# Patient Record
Sex: MALE | Race: WHITE | Employment: FULL TIME | URBAN - METROPOLITAN AREA
[De-identification: names, ages, dates, MRNs, and addresses within clinical notes are randomized per-mention and may not be internally consistent; named-entity substitution may affect disease eponyms.]

---

## 2018-11-05 ENCOUNTER — OFFICE VISIT (OUTPATIENT)
Dept: FAMILY MEDICINE CLINIC | Age: 38
End: 2018-11-05
Payer: COMMERCIAL

## 2018-11-05 VITALS
SYSTOLIC BLOOD PRESSURE: 123 MMHG | BODY MASS INDEX: 25.86 KG/M2 | HEIGHT: 75 IN | DIASTOLIC BLOOD PRESSURE: 73 MMHG | HEART RATE: 73 BPM | WEIGHT: 208 LBS | RESPIRATION RATE: 18 BRPM

## 2018-11-05 DIAGNOSIS — Z00.00 ROUTINE CHECK-UP: Primary | ICD-10-CM

## 2018-11-05 PROCEDURE — 99396 PREV VISIT EST AGE 40-64: CPT | Performed by: FAMILY MEDICINE

## 2018-11-05 ASSESSMENT — PATIENT HEALTH QUESTIONNAIRE - PHQ9
SUM OF ALL RESPONSES TO PHQ QUESTIONS 1-9: 0
SUM OF ALL RESPONSES TO PHQ QUESTIONS 1-9: 0
2. FEELING DOWN, DEPRESSED OR HOPELESS: 0
SUM OF ALL RESPONSES TO PHQ9 QUESTIONS 1 & 2: 0
1. LITTLE INTEREST OR PLEASURE IN DOING THINGS: 0

## 2018-11-05 NOTE — PROGRESS NOTES
goiter and no bruits. Chest : no deformities. Lungs : clear, no wheezing or crackles. No consolidation. Heart : regular, normal S1S2, no murmurs. Abdomen : soft, no hepatomegaly or splenomegaly. No masses or bruits. Extremities : no edema. Musculoskeletal : no deformities. No effusion or joint tenderness. Back : no deformities and normal ROM. No CVA tenderness. Vascular : normal dorsalis pedis and posterior tibial pulses bilaterally. Skin : no significant rash or lesions. Neurological : normal speech and judgement. Normal gait. No weakness or focal deficits. Psychiatric : well groomed, normal thought content, normal mood. Affect congruent. BMI was elevated today, and weight loss plan recommended is : conventional weight loss and daily exercise regimen. No visits with results within 3 Month(s) from this visit. Latest known visit with results is:   Hospital Outpatient Visit on 07/18/2012   Component Date Value    Cholesterol 07/18/2012 193     Triglycerides 07/18/2012 90     HDL 07/18/2012 64.0     LDL Calculated 07/18/2012 111*         Assessment / Plan :  Healthy. Continue same   advised to avoid basketball due to issues with ankle pain. Also had repair of ligament tears of his left knee. Patient is aware of all that but he enjoys playing the game and will continue to do so. Fully informed        1. Vaccinations reviewed with patient. Patient fully informed about indications, risks and alternatives of vaccines. All questions answered. 2. Screening test or procedures reviewed and discussed all as recommended by the [employer]PSTF and other guidelines. Patient fully informed of benefits and risks. All questions answered. Discussed diet and weight loss , encouraged to perform cardio exercise regularly. Anticipatory guidance provided. Follow-up yearly or sooner as needed.

## 2020-01-27 NOTE — PROGRESS NOTES
consolidation. Heart : regular, normal S1S2, no murmurs. Abdomen : soft, no hepatomegaly or splenomegaly. No masses or bruits. Extremities : no edema. Musculoskeletal : no deformities. No effusion or joint tenderness. Back : no deformities and normal ROM. No CVA tenderness. Vascular : normal dorsalis pedis and posterior tibial pulses bilaterally. Skin : no significant rash or lesions. Neurological : normal speech and judgement. Normal gait. No weakness or focal deficits. Psychiatric : well groomed, normal thought content, normal mood. Affect congruent. BMI was elevated today, and weight loss plan recommended is : conventional weight loss and daily exercise regimen. No visits with results within 3 Month(s) from this visit. Latest known visit with results is:   Hospital Outpatient Visit on 07/18/2012   Component Date Value    Cholesterol 07/18/2012 193     Triglycerides 07/18/2012 90     HDL 07/18/2012 64.0     LDL Calculated 07/18/2012 111*         Assessment / Plan:    Here for yearly check-up. 1. Allergic rhinitis, unspecified seasonality, unspecified trigger  Stable. 1. Vaccinations reviewed with patient. Patient fully informed about indications, risks and alternatives of vaccines. All questions answered. 2. Screening test or procedures reviewed and discussed all as recommended by the [employer]PSTF and other guidelines. Patient fully informed of benefits and risks. All questions answered. Discussed diet and weight loss , encouraged to perform cardio exercise regularly. Anticipatory guidance provided. Follow-up yearly or sooner as needed.

## 2020-01-28 ENCOUNTER — OFFICE VISIT (OUTPATIENT)
Dept: FAMILY MEDICINE CLINIC | Age: 40
End: 2020-01-28
Payer: COMMERCIAL

## 2020-01-28 VITALS
WEIGHT: 201 LBS | SYSTOLIC BLOOD PRESSURE: 124 MMHG | HEART RATE: 74 BPM | DIASTOLIC BLOOD PRESSURE: 80 MMHG | BODY MASS INDEX: 25.8 KG/M2 | HEIGHT: 74 IN | RESPIRATION RATE: 16 BRPM

## 2020-01-28 PROCEDURE — 99396 PREV VISIT EST AGE 40-64: CPT | Performed by: FAMILY MEDICINE

## 2020-01-28 ASSESSMENT — PATIENT HEALTH QUESTIONNAIRE - PHQ9
SUM OF ALL RESPONSES TO PHQ9 QUESTIONS 1 & 2: 0
SUM OF ALL RESPONSES TO PHQ QUESTIONS 1-9: 0
SUM OF ALL RESPONSES TO PHQ QUESTIONS 1-9: 0
2. FEELING DOWN, DEPRESSED OR HOPELESS: 0
1. LITTLE INTEREST OR PLEASURE IN DOING THINGS: 0